# Patient Record
Sex: FEMALE | URBAN - METROPOLITAN AREA
[De-identification: names, ages, dates, MRNs, and addresses within clinical notes are randomized per-mention and may not be internally consistent; named-entity substitution may affect disease eponyms.]

---

## 2022-01-16 ENCOUNTER — EMERGENCY (EMERGENCY)
Facility: HOSPITAL | Age: 57
LOS: 1 days | Discharge: HOME | End: 2022-01-16
Attending: EMERGENCY MEDICINE
Payer: MEDICAID

## 2022-01-16 VITALS
HEIGHT: 66 IN | OXYGEN SATURATION: 97 % | RESPIRATION RATE: 16 BRPM | SYSTOLIC BLOOD PRESSURE: 121 MMHG | DIASTOLIC BLOOD PRESSURE: 85 MMHG | HEART RATE: 72 BPM | WEIGHT: 160.06 LBS | TEMPERATURE: 98 F

## 2022-01-16 LAB — SARS-COV-2 RNA SPEC QL NAA+PROBE: SIGNIFICANT CHANGE UP

## 2022-01-16 PROCEDURE — 99282 EMERGENCY DEPT VISIT SF MDM: CPT

## 2022-01-16 PROCEDURE — 87635 SARS-COV-2 COVID-19 AMP PRB: CPT

## 2022-01-16 NOTE — ED PROVIDER NOTE - PATIENT PORTAL LINK FT
You can access the FollowMyHealth Patient Portal offered by [facility] by registering at the following website: [email or web address] By joining [facility]’s FollowMyHealth portal, you will also be able to view your health information using other applications (apps) compatible with our system.

## 2022-01-16 NOTE — ED PROVIDER NOTE - NSFOLLOWUPINSTRUCTIONS_ED_ALL_ED_FT
Today you will tested for the COVID-19 virus. If you test positive you have to isolate yourself for the next 5 days and then you can come out of isolation as long as you are 1 day without fever (while not taking any medications for fever).    *Having a negative test doesn't guarantee 100% that you don't have COVID especially if you may have contracted the virus in the last few days. It may take time to show on the test. The best thing to do if you are concerned is to isolate yourself.    You will get a link via text message or email. You will need to click on the link and register to get the result. The test results may take up to 48-72 hours to come back.    Come back to the emergency room if you have chest pain, shortness of breath or any concerning symptoms to you.    Call your local emergency number (911 in the [place]) or have someone else call if:   •You have a seizure.  •You cannot be woken.  •You have chest pain or trouble breathing.    Return to the emergency department if:   •You have a stiff neck, a bad headache, and sensitivity to light.  •You feel weak, dizzy, or confused.  •You stop urinating or urinate a lot less than usual.  •You cough up blood or thick yellow or green mucus.  •You have severe abdominal pain or your abdomen is larger than usual.

## 2022-01-16 NOTE — ED PROVIDER NOTE - OBJECTIVE STATEMENT
56 year-old female, presents for COVID-19 test. Patient denies any symptoms at this time. Had contact with people who tested positive for the COVID-19.

## 2022-01-16 NOTE — ED PROVIDER NOTE - ATTENDING CONTRIBUTION TO CARE
Well-appearing, vitals stable. O2 sat WNL. No signs of distress. No CANALES. Will test for COVID-19 infection. Will discharge with isolation precautions and strict return instructions. Questions answered.
